# Patient Record
(demographics unavailable — no encounter records)

---

## 2025-01-29 NOTE — ASSESSMENT
[FreeTextEntry1] : Patient states he has a tall cam walker boot at home which I recommended he use.  Weightbearing as tolerated.  Rest ice, anti-inflammatory.  He works for the [employer] department he does a lot of walking.  He plans on remaining out of work.  Will put in request for an MRI right ankle, follow-up with Dr. Rhoades for further evaluation and treatment

## 2025-01-29 NOTE — IMAGING
[de-identified] : On examination of the right ankle mild lateral swelling, no ecchymosis, no erythema.  Skin is intact.  Tenderness over the ATFL, PTFL and CFL.  Tenderness of the cuboid.  No tenderness over the metatarsals.  No tenderness of the Lisfranc, no tenderness over the toes.  No tenderness of the lateral malleolus.  No tenderness over the medial malleolus.  No tenderness of the deltoid.  No tenderness over the Achilles or calcaneus.  Negative Powell's.  Calf is soft nontender.  X-ray right ankle and right foot reviewed from [facility] January 28 no obvious fracture or dislocation

## 2025-01-29 NOTE — HISTORY OF PRESENT ILLNESS
[de-identified] : 37-year-old male comes in today for an evaluation of his right ankle pain and injury that occurred at work on January 28.  Patient works [employer].  He states he took a step and felt pain and pop in the ankle.  History of prior Achilles injuries

## 2025-02-13 NOTE — PHYSICAL EXAM
[de-identified] : He is alert oriented x 3.  Pleasant cooperative.  Tenderness throughout the entire ankle.  Grossly intact range of motion.  Neurovascular intact.

## 2025-02-13 NOTE — HISTORY OF PRESENT ILLNESS
[de-identified] : Is here for his right ankle.  He injured it approximately 3 weeks ago at work.  He has not improved despite conservative measures.  Still has pain over the lateral and anterior ankle.

## 2025-02-13 NOTE — DISCUSSION/SUMMARY
[de-identified] : Patient is failed to improve despite conservative measures.  Would like to obtain an MRI without contrast of the right ankle at this point.  I will see him back in 3 weeks for reassessment.  Continue with home exercise program.  All questions answered.

## 2025-03-12 NOTE — PHYSICAL EXAM
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  This is forefoot driven as evidenced on the Hunter block test.  He is tender over the peroneal tendons.  He has an unstable ankle with anterior drawer talar tilt test.  The hindfoot is flexible.  Varus alignment to the hindfoot.

## 2025-03-12 NOTE — HISTORY OF PRESENT ILLNESS
[de-identified] : Patient is here for review of his right ankle MRI.  He is still in significant pain.  He feels significant instability.

## 2025-03-12 NOTE — DATA REVIEWED
[FreeTextEntry1] : MRI was reviewed.  MRI demonstrates evidence of a full-thickness tear of the peroneus longus tendon.  There is a partial-thickness tear/split tear of the peroneus brevis tendon.  There is evidence of acute on chronic injury to the ATFL with chronic attenuation.

## 2025-03-12 NOTE — DISCUSSION/SUMMARY
[de-identified] : I had a lengthy discussion with the patient regarding his MRI findings and treatment options available.  Patient has not had any success so far with conservative management.  He still describes significant pain and instability despite immobilization and therapy.  Surgical management for his case would be a peroneus longus to brevis tenodesis, peroneus brevis repair, lateral ankle ligament reconstruction.  In order to protect the repair and to prevent future recurrence we would also address his deformity of the hindfoot with a Anderson calcaneal closing wedge lateral osteotomy along with a dorsiflexion osteotomy of the first metatarsal.  The patient does have a 1 pack-a-day smoking history and I did advise him that this would likely prolong his recovery and placement higher risk for wound healing issues along with nonunion and malunion rate.  Other risks include but are not limited to infection, need for revision surgery, persistent pain, DVT PE loss of limb loss of life.  Patient was to proceed forward.  All questions sought and answered the patient remains 100% temporarily disabled from his workplace injury.

## 2025-03-12 NOTE — HISTORY OF PRESENT ILLNESS
[de-identified] : Patient is here for review of his right ankle MRI.  He is still in significant pain.  He feels significant instability.

## 2025-03-12 NOTE — PHYSICAL EXAM
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  This is forefoot driven as evidenced on the Hunter block test.  He is tender over the peroneal tendons.  He has an unstable ankle with anterior drawer talar tilt test.  The hindfoot is flexible.  Varus alignment to the hindfoot.

## 2025-05-21 NOTE — HISTORY OF PRESENT ILLNESS
[de-identified] : Patient is here for postop exam.  Status post a right foot cavus reconstruction along with peroneal tendon debridement and tenodesis.  He is doing well.  Denies any significant pain.  Denies any fevers chills calf pain chest pain shortness of breath.

## 2025-05-21 NOTE — DISCUSSION/SUMMARY
[de-identified] : Sutures removed sutures placed and the patient was placed inside a cam boot.  He will remain nonweightbearing with the boot on.  I will see him back in 1 month for reassessment.  All questions answered.

## 2025-05-21 NOTE — PHYSICAL EXAM
[de-identified] : Incisions are well-healed.  No evidence of infection.  The foot appears well corrected.  Compartments of the compressible.  Neurovascular intact.

## 2025-06-25 NOTE — DISCUSSION/SUMMARY
[de-identified] : Patient findings discussed with him.  At this point I would like him begin physical therapy and weight-bear with the boot on.  In 2 weeks he can begin weightbearing outside the boot into a brace.  I would like to see him back in 1 month.  All questions answered

## 2025-06-25 NOTE — PHYSICAL EXAM
[de-identified] : His surgical incisions are well-healed.  There is no evidence of infection.  His foot remains well corrected.  No wound dehiscence.  He is neurovascular intact distally.  Some slight numbness over the sural nerve.

## 2025-06-25 NOTE — DATA REVIEWED
[FreeTextEntry1] : 3 views of the patient's right foot ordered reviewed by me personally.  X-rays demonstrate healing osteotomies of the first metatarsal as well as calcaneus.  No hardware complication.

## 2025-06-25 NOTE — HISTORY OF PRESENT ILLNESS
[de-identified] : Patient is now approximately 7 weeks status post surgery.  He is doing great.  Really has minimal discomfort.  Has been compliant with her weightbearing restrictions up to this point.

## 2025-07-23 NOTE — PHYSICAL EXAM
[de-identified] : His surgical incisions are well-healed.  On standing the patient does demonstrate improved alignment of his foot compared to preoperative alignment.  There is objective numbness but not complete overlying the dorsal and plantar aspects of the 2nd, 3rd and 4th toes.  He demonstrates full range of motion.  Otherwise neurovascular intact with an intact dorsalis pedis pulse.

## 2025-07-23 NOTE — DISCUSSION/SUMMARY
[de-identified] : I discussed the patient's clinical radiographic findings with him.  The patient at this time should continue with physical therapy.  He can continue wearing a normal sneaker.  I did reassure him that this numbness should improve and could be related to some aspects of tarsal tunnel which can occur after a large correction of his deformity.  We will monitor the symptoms and treat accordingly.  I will see him back in 1 month for reassessment.  Remains 100% percent disabled.

## 2025-07-23 NOTE — DATA REVIEWED
[FreeTextEntry1] : 3 views of the patient's right foot ordered reviewed by me personally.  X-rays demonstrate fully healed osteotomies and no hardware complication.

## 2025-07-23 NOTE — HISTORY OF PRESENT ILLNESS
[de-identified] : Patient is here for follow-up of his right foot.  Overall he is doing better.  He is walking with a sneaker.  He still notes occasional areas of numbness present which have been pretty constant.  Localized the numbness over the dorsal and plantar aspects of the lesser toes.  He is doing physical therapy.